# Patient Record
Sex: FEMALE
[De-identification: names, ages, dates, MRNs, and addresses within clinical notes are randomized per-mention and may not be internally consistent; named-entity substitution may affect disease eponyms.]

---

## 2023-04-03 PROBLEM — Z00.00 ENCOUNTER FOR PREVENTIVE HEALTH EXAMINATION: Status: ACTIVE | Noted: 2023-04-03

## 2023-04-04 ENCOUNTER — APPOINTMENT (OUTPATIENT)
Dept: ORTHOPEDIC SURGERY | Facility: CLINIC | Age: 23
End: 2023-04-04
Payer: MEDICAID

## 2023-04-04 DIAGNOSIS — M22.2X1 PATELLOFEMORAL DISORDERS, RIGHT KNEE: ICD-10-CM

## 2023-04-04 DIAGNOSIS — M25.562 PAIN IN LEFT KNEE: ICD-10-CM

## 2023-04-04 DIAGNOSIS — M25.561 PAIN IN RIGHT KNEE: ICD-10-CM

## 2023-04-04 DIAGNOSIS — M22.2X2 PATELLOFEMORAL DISORDERS, LEFT KNEE: ICD-10-CM

## 2023-04-04 PROCEDURE — 99213 OFFICE O/P EST LOW 20 MIN: CPT

## 2023-04-04 PROCEDURE — 73562 X-RAY EXAM OF KNEE 3: CPT | Mod: LT,RT

## 2023-04-04 RX ORDER — MELOXICAM 15 MG/1
15 TABLET ORAL
Qty: 30 | Refills: 0 | Status: ACTIVE | COMMUNITY
Start: 2023-04-04 | End: 1900-01-01

## 2023-04-04 NOTE — DISCUSSION/SUMMARY
[de-identified] : I reviewed the x-ray findings with the patient.  In my opinion, the patient has patellofemoral pain syndrome.  I recommend physical therapy exercises.  She was given a home program from the [facility] for a knee conditioning program.  She will start anti-inflammatory medication, Rx for meloxicam to her pharmacy.  She will take 1 tablet once a day for the next 2 weeks and then on as-needed basis.  She can take Tylenol for breakthrough pain but she will avoid taking Advil, Motrin or ibuprofen or Aleve with this medication.  We will see her back in 6 weeks for further evaluation, if she is still having symptoms in the left knee I would recommend further imaging studies for the left knee.\par \par Supervising Physician: Dr. Stoner

## 2023-04-04 NOTE — IMAGING
[de-identified] : Physical exam of both knees: No effusion, no erythema, no ecchymosis.  Right knee flexion  to 140 degrees, full extension.  Left knee flexion to 130 degrees, full extension.  Tenderness to palpation over the anterior aspect of each knee.  No tenderness to palpation over the medial and lateral joint lines of each knee.  No tenderness palpation over the collateral agreements of each knee.  Negative Tristan's bilaterally.  Negative Lachman bilaterally.  No laxity to valgus and varus stress testing of either knee.  Intact to light touch and sensation, nonantalgic gait.

## 2023-04-04 NOTE — HISTORY OF PRESENT ILLNESS
[de-identified] : The patient is a 22-year-old female here for an evaluation of both knees.  She has had pain in both knees for the past 2 weeks.  No trauma to the area.  The left knee troubles her more than the right knee.  The patient states she works out regularly took a week off from the gym because she was sick and when she returned the gym she noticed pain in each knee after working out.  She did not have an injury in the gym she tried Tylenol with which did not provide her with any relief.  The pain has been increasing in severity.  She points anteriorly as to where the pain is.  She reports that she feels pain when she does a deep squat or to sit in a chair that is low.

## 2023-04-04 NOTE — DATA REVIEWED
[Bilateral] : of the bilateral [Knee] : knee [FreeTextEntry1] : 3 views of each knee were obtained here in the office and show: Well-preserved medial and lateral compartments.  No fractures noted.  No soft tissue calcifications, no bone masses.

## 2023-06-02 ENCOUNTER — APPOINTMENT (OUTPATIENT)
Dept: OBGYN | Facility: CLINIC | Age: 23
End: 2023-06-02

## 2023-06-19 ENCOUNTER — APPOINTMENT (OUTPATIENT)
Dept: ORTHOPEDIC SURGERY | Facility: CLINIC | Age: 23
End: 2023-06-19